# Patient Record
Sex: MALE | Race: WHITE | URBAN - METROPOLITAN AREA
[De-identification: names, ages, dates, MRNs, and addresses within clinical notes are randomized per-mention and may not be internally consistent; named-entity substitution may affect disease eponyms.]

---

## 2021-10-28 ENCOUNTER — WEB ENCOUNTER (OUTPATIENT)
Dept: URBAN - METROPOLITAN AREA CLINIC 54 | Facility: CLINIC | Age: 31
End: 2021-10-28

## 2021-10-28 ENCOUNTER — OFFICE VISIT (OUTPATIENT)
Dept: URBAN - METROPOLITAN AREA CLINIC 54 | Facility: CLINIC | Age: 31
End: 2021-10-28
Payer: COMMERCIAL

## 2021-10-28 DIAGNOSIS — M10.00 IDIOPATHIC GOUT, UNSPECIFIED CHRONICITY, UNSPECIFIED SITE: ICD-10-CM

## 2021-10-28 DIAGNOSIS — E66.09 OTHER OBESITY DUE TO EXCESS CALORIES: ICD-10-CM

## 2021-10-28 DIAGNOSIS — R74.8 ABNORMAL LIVER ENZYMES: ICD-10-CM

## 2021-10-28 DIAGNOSIS — E78.5 HYPERLIPIDEMIA, UNSPECIFIED HYPERLIPIDEMIA TYPE: ICD-10-CM

## 2021-10-28 PROBLEM — 24595009: Status: ACTIVE | Noted: 2021-10-28

## 2021-10-28 PROBLEM — 55822004: Status: ACTIVE | Noted: 2021-10-28

## 2021-10-28 PROBLEM — 414916001: Status: ACTIVE | Noted: 2021-10-28

## 2021-10-28 PROBLEM — 162864005: Status: ACTIVE | Noted: 2021-10-28

## 2021-10-28 PROCEDURE — 99244 OFF/OP CNSLTJ NEW/EST MOD 40: CPT | Performed by: INTERNAL MEDICINE

## 2021-10-28 PROCEDURE — 99204 OFFICE O/P NEW MOD 45 MIN: CPT | Performed by: INTERNAL MEDICINE

## 2021-10-28 RX ORDER — ALLOPURINOL 100 MG/1
1 TABLET TABLET ORAL ONCE A DAY
Status: ON HOLD | COMMUNITY

## 2021-10-28 RX ORDER — COLCHICINE 0.6 MG/1
1 TABLET TABLET, FILM COATED ORAL BID
Status: ACTIVE | COMMUNITY

## 2021-10-28 RX ORDER — LEVOTHYROXINE SODIUM 0.05 MG/1
2.5 TABLETS M-F 3 TABLETS S-S TABLET ORAL
Status: ACTIVE | COMMUNITY

## 2021-10-28 NOTE — HPI-TODAY'S VISIT:
Patient is a 32 yo man referred by Dr. Paty Tan for above reasons. A copy of this document will be sent to referring provider. He was noted to have abnormal LFT's in Feb 2021 (Transaminases 2 x ULN). However levels increased in July and Sep 2021 (>>311, >>138 with intact synthetic function). Recheck in Oct 2021 showed improvement. He was instructed to stop Allopurinol after his previous elevation. Risk factors for liver disease include alcohol use 4-5 beers per week, HLD and class I obesity. No signs of advanced liver disease. No family history of liver disease. He has hypothyroidism on replacement x 2 years (Hashimoto's). He has gout on Colchicine. He has never had a liver biopsy. He works at [employer]. He feels fatigued but denies jaundice, pruritus, weight loss.

## 2021-11-01 LAB
A/G RATIO: 2.1
ACTIN (SMOOTH MUSCLE) ANTIBODY: 7
ALBUMIN: 5.1
ALKALINE PHOSPHATASE: 90
ALT (SGPT): 134
ANA DIRECT: NEGATIVE
AST (SGOT): 61
BASO (ABSOLUTE): 0
BASOS: 1
BILIRUBIN, TOTAL: 0.4
BUN/CREATININE RATIO: 11
BUN: 10
CALCIUM: 10.2
CARBON DIOXIDE, TOTAL: 24
CERULOPLASMIN: 19.6
CHLORIDE: 100
CREATININE: 0.9
EGFR IF AFRICN AM: 131
EGFR IF NONAFRICN AM: 113
EOS (ABSOLUTE): 0.2
EOS: 2
FERRITIN, SERUM: 275
GLOBULIN, TOTAL: 2.4
GLUCOSE: 85
HBSAG SCREEN: NEGATIVE
HEMATOCRIT: 48.1
HEMATOLOGY COMMENTS:: (no result)
HEMOGLOBIN: 16.6
HEP A AB, TOTAL: POSITIVE
IMMATURE CELLS: (no result)
IMMATURE GRANS (ABS): 0
IMMATURE GRANULOCYTES: 0
IMMUNOGLOBULIN A, QN, SERUM: 190
IMMUNOGLOBULIN E, TOTAL: 145
IMMUNOGLOBULIN G, QN, SERUM: 926
IMMUNOGLOBULIN M, QN, SERUM: 72
INR: 0.9
IRON BIND.CAP.(TIBC): 330
IRON SATURATION: 34
IRON: 112
LIVER-KIDNEY MICROSOMAL AB: 1
LYMPHS (ABSOLUTE): 2.6
LYMPHS: 32
MCH: 29.6
MCHC: 34.5
MCV: 86
MITOCHONDRIAL (M2) ANTIBODY: <20
MONOCYTES(ABSOLUTE): 0.5
MONOCYTES: 6
NEUTROPHILS (ABSOLUTE): 4.9
NEUTROPHILS: 59
NRBC: (no result)
PLATELETS: 295
POTASSIUM: 4.5
PROTEIN, TOTAL: 7.5
PROTHROMBIN TIME: 10
RBC: 5.61
RDW: 12.5
SODIUM: 141
UIBC: 218
WBC: 8.1

## 2022-01-24 ENCOUNTER — OFFICE VISIT (OUTPATIENT)
Dept: URBAN - METROPOLITAN AREA CLINIC 54 | Facility: CLINIC | Age: 32
End: 2022-01-24

## 2022-01-24 RX ORDER — LEVOTHYROXINE SODIUM 0.05 MG/1
2.5 TABLETS M-F 3 TABLETS S-S TABLET ORAL
Status: ACTIVE | COMMUNITY

## 2022-01-24 RX ORDER — ALLOPURINOL 100 MG/1
1 TABLET TABLET ORAL ONCE A DAY
COMMUNITY

## 2022-01-24 RX ORDER — COLCHICINE 0.6 MG/1
1 TABLET TABLET, FILM COATED ORAL BID
Status: ACTIVE | COMMUNITY

## 2022-02-21 ENCOUNTER — DASHBOARD ENCOUNTERS (OUTPATIENT)
Age: 32
End: 2022-02-21

## 2022-03-04 ENCOUNTER — OFFICE VISIT (OUTPATIENT)
Dept: URBAN - METROPOLITAN AREA CLINIC 54 | Facility: CLINIC | Age: 32
End: 2022-03-04

## 2022-03-04 RX ORDER — COLCHICINE 0.6 MG/1
1 TABLET TABLET, FILM COATED ORAL BID
COMMUNITY

## 2022-03-04 RX ORDER — LEVOTHYROXINE SODIUM 0.05 MG/1
2.5 TABLETS M-F 3 TABLETS S-S TABLET ORAL
COMMUNITY

## 2022-03-04 RX ORDER — ALLOPURINOL 100 MG/1
1 TABLET TABLET ORAL ONCE A DAY
COMMUNITY